# Patient Record
Sex: MALE | HISPANIC OR LATINO | URBAN - METROPOLITAN AREA
[De-identification: names, ages, dates, MRNs, and addresses within clinical notes are randomized per-mention and may not be internally consistent; named-entity substitution may affect disease eponyms.]

---

## 2020-12-04 ENCOUNTER — NEW PATIENT (OUTPATIENT)
Dept: URBAN - METROPOLITAN AREA CLINIC 64 | Facility: CLINIC | Age: 75
End: 2020-12-04
Payer: MEDICARE

## 2020-12-04 DIAGNOSIS — E11.9 TYPE 2 DIABETES MELLITUS WITHOUT COMPLICATIONS: Primary | ICD-10-CM

## 2020-12-04 DIAGNOSIS — H25.13 AGE-RELATED NUCLEAR CATARACT, BILATERAL: ICD-10-CM

## 2020-12-04 DIAGNOSIS — H35.3121 NONEXUDATIVE MACULAR DEGENERATION, EARLY DRY STAGE, LEFT EYE: ICD-10-CM

## 2020-12-04 PROCEDURE — 92015 DETERMINE REFRACTIVE STATE: CPT | Performed by: OPTOMETRIST

## 2020-12-04 PROCEDURE — 92004 COMPRE OPH EXAM NEW PT 1/>: CPT | Performed by: OPTOMETRIST

## 2020-12-04 PROCEDURE — 92134 CPTRZ OPH DX IMG PST SGM RTA: CPT | Performed by: OPTOMETRIST

## 2020-12-04 ASSESSMENT — INTRAOCULAR PRESSURE
OS: 14
OD: 15

## 2020-12-04 ASSESSMENT — VISUAL ACUITY
OS: 20/25
OD: 20/25

## 2020-12-04 ASSESSMENT — KERATOMETRY
OS: 44.91
OD: 45.25

## 2022-03-25 ENCOUNTER — OFFICE VISIT (OUTPATIENT)
Dept: URBAN - METROPOLITAN AREA CLINIC 64 | Facility: CLINIC | Age: 77
End: 2022-03-25
Payer: MEDICARE

## 2022-03-25 DIAGNOSIS — H16.223 KERATOCONJUNCTIVITIS SICCA, BILATERAL: ICD-10-CM

## 2022-03-25 DIAGNOSIS — H52.13 MYOPIA, BILATERAL: ICD-10-CM

## 2022-03-25 PROCEDURE — 92134 CPTRZ OPH DX IMG PST SGM RTA: CPT | Performed by: OPTOMETRIST

## 2022-03-25 PROCEDURE — 92014 COMPRE OPH EXAM EST PT 1/>: CPT | Performed by: OPTOMETRIST

## 2022-03-25 ASSESSMENT — KERATOMETRY
OS: 44.73
OD: 45.19

## 2022-03-25 ASSESSMENT — INTRAOCULAR PRESSURE
OS: 17
OD: 13

## 2022-03-25 ASSESSMENT — VISUAL ACUITY
OD: 20/30
OS: 20/25

## 2022-03-25 NOTE — IMPRESSION/PLAN
Impression: Myopia, bilateral Plan: Doing well with current DVO glasses. No new Rx needed this year.

## 2022-03-25 NOTE — IMPRESSION/PLAN
Impression: Nonexudative macular degeneration, early dry stage, left eye Plan: Stable. Continue to monitor.

## 2022-03-25 NOTE — IMPRESSION/PLAN
Impression: Keratoconjunctivitis sicca, bilateral: [id number]. Plan: Recommend systane or refresh drops qid OU.